# Patient Record
Sex: FEMALE
[De-identification: names, ages, dates, MRNs, and addresses within clinical notes are randomized per-mention and may not be internally consistent; named-entity substitution may affect disease eponyms.]

---

## 2023-05-27 ENCOUNTER — NURSE TRIAGE (OUTPATIENT)
Dept: OTHER | Facility: CLINIC | Age: 73
End: 2023-05-27

## 2023-05-27 NOTE — TELEPHONE ENCOUNTER
Location of patient: [place]    Subjective: Caller states \"a few trips to the bathroom today and I have large clots each time. \"     Current Symptoms: vaginal bleeding, large blood clots     Clots are size of a fist.  Changes pad about every 2 hours. 2 large clots just dropped to the floor when she pulled down her pants. Associated Symptoms: NA    Pain Severity: 0/10     Cancer patient - HX lumpectomy last year;  last radiation treatment was 1 month ago. Denies blood thinning medications. She is on a hormone therapy. Temperature:  Denies    What has been tried: nothing    Recommended disposition: Go to ED Now    Care advice provided, patient verbalizes understanding; denies any other questions or concerns.     Outcome: Patient/caller agrees to proceed to [facility]      This triage is a result of a call to the [address] [place] [address]        Reason for Disposition   Patient sounds very sick or weak to the triager    Protocols used: Vaginal Bleeding - Postmenopausal-ADULT-